# Patient Record
(demographics unavailable — no encounter records)

---

## 2024-12-06 NOTE — PHYSICAL EXAM
[Examination Of The Breasts] : a normal appearance [No Masses] : no breast masses were palpable [Labia Majora] : normal [Labia Minora] : normal [Normal] : normal [Uterine Adnexae] : normal [Chaperone Present] : A chaperone was present in the examining room during all aspects of the physical examination [[id number]] : A chaperone was present during the pelvic exam. [FreeTextEntry2] : Dali Ponce Medical assistant  [FreeTextEntry1] : vulvar mass 1.5 cm mobile,nontender on the left mons, no erythema

## 2024-12-06 NOTE — DISCUSSION/SUMMARY
[FreeTextEntry1] : 64 y/o postmenopausal woman  annual exam Pap with cotesting  Screening Mammogram and breast US UTD  Screening Colonoscopy UTD  Paitent states has a vulvar mass, had for years but now increasing in size, discussed conservative managment, but if increasing in size and want to know what it is (chronic/resolved folliculitis, lipoma, or other pathology) recommend vulvar biopsy/resection, + deep subcutaneous tissue, refer to Dr. Huffman of [facility] for surgical consultation  +weight loss vaginal irritation, and vaginal irritation. requesting lotrisone which works for her and the estradiol cream refil which urologist prescribed MEdical assistant present for exam, malena hostile, upset she was waiting  F/u 1 year/PRN

## 2024-12-06 NOTE — HISTORY OF PRESENT ILLNESS
[FreeTextEntry1] : 62 y/o postmenopausal woman  annual exam PAP 2022 neg HOV neg Paitent states has a vulvar mass, had for years but now increasing in size  no vaginal bleeding UTD with colonoscopy, hx of gastritis, has chronic kedney disease,  had a 30 lb weight loss

## 2025-03-25 NOTE — ASSESSMENT
[FreeTextEntry1] : 63 year-old female with history of hypertension, and proteinuria, CKD3A.  CKD3A: CKD stable. Reviewed GFR and Cr.  Modest animal protein diet.  Proteinuria improving. Continue to trend ACR. Continue ARB Applauded pt for continuing to lose weight Hypertension: Blood pressure close to goal.  Home BP controlled.  She will continue to monitor home BPs. Continue telmisartan 20 mg and Amlodipine 10mg. Continue low Na diet. Continue dietary and lifestyle modifications Continue to avoid NSAIDs. All questions were answered and followup in 6 months

## 2025-03-25 NOTE — PHYSICAL EXAM
[General Appearance - Alert] : alert [General Appearance - In No Acute Distress] : in no acute distress [Sclera] : the sclera and conjunctiva were normal [Outer Ear] : the ears and nose were normal in appearance [Neck Appearance] : the appearance of the neck was normal [Respiration, Rhythm And Depth] : normal respiratory rhythm and effort [Auscultation Breath Sounds / Voice Sounds] : lungs were clear to auscultation bilaterally [Heart Rate And Rhythm] : heart rate was normal and rhythm regular [Heart Sounds Pericardial Friction Rub] : no pericardial rub [Edema] : there was no peripheral edema [Bowel Sounds] : normal bowel sounds [Abdomen Soft] : soft [Abnormal Walk] : normal gait [Skin Color & Pigmentation] : normal skin color and pigmentation [Skin Turgor] : normal skin turgor [] : no rash [No Focal Deficits] : no focal deficits [Oriented To Time, Place, And Person] : oriented to person, place, and time [Affect] : the affect was normal [Mood] : the mood was normal

## 2025-03-25 NOTE — HISTORY OF PRESENT ILLNESS
[FreeTextEntry1] : 63-year-old female here for follow up of CKD3a, HTN and proteinuria.  Reports "feeling great", is eating healthier, he reports maintaining a low Na diet, reports working out at gym doing cardio and strength building.  Maintains good hydration, drinking 50oz of fluid per day. Not eating red meat and eating chicken fish and grains, fruits and vegetables

## 2025-06-02 NOTE — HISTORY OF PRESENT ILLNESS
[de-identified] : 62 yo female with h/o anemia, HTN, CKD, hematuria, hyperlipidemia, gastritis, and cervical disc disease and Vitamin D and vitamin B 12 deficiency here for CPE.  CKD diagnosed last year and now seen by nephrology. Exercises daily and feeling well with improvement in all her symptoms except cervical pain. Eating well and has lost 30 lbs. Neck pain described as left sided with decreased range of motion and radiation to left shoulder. Current meds are amlodipine 10; telmisartan 10 mg; Carey D; and estrogen vaginal cream weekly. BPs at home 120s/70s.

## 2025-06-02 NOTE — COUNSELING
[Behavioral health counseling provided] : Behavioral health counseling provided [Engage in a relaxing activity] : Engage in a relaxing activity [AUDIT-C Screening administered and reviewed] : AUDIT-C Screening administered and reviewed [None] : None [Potential consequences of obesity discussed] : Potential consequences of obesity discussed [Benefits of weight loss discussed] : Benefits of weight loss discussed

## 2025-06-02 NOTE — REVIEW OF SYSTEMS
[Heartburn] : heartburn [Hematuria] : hematuria [Negative] : Heme/Lymph [Recent Change In Weight] : ~T recent weight change [Fatigue] : no fatigue [Chest Pain] : no chest pain [Shortness Of Breath] : no shortness of breath [Cough] : no cough [Vomiting] : no vomiting [Dysuria] : no dysuria [Swollen Glands] : no swollen glands

## 2025-06-02 NOTE — HEALTH RISK ASSESSMENT
[Very Good] : ~his/her~  mood as very good [No] : In the past 12 months have you used drugs other than those required for medical reasons? No [No falls in past year] : Patient reported no falls in the past year [Little interest or pleasure doing things] : 1) Little interest or pleasure doing things [Feeling down, depressed, or hopeless] : 2) Feeling down, depressed, or hopeless [0] : 2) Feeling down, depressed, or hopeless: Not at all (0) [PHQ-2 Negative - No further assessment needed] : PHQ-2 Negative - No further assessment needed [Never] : Never [No Retinopathy] : No retinopathy [Patient reported mammogram was normal] : Patient reported mammogram was normal [Patient reported PAP Smear was normal] : Patient reported PAP Smear was normal [Patient reported bone density results were normal] : Patient reported bone density results were normal [Patient reported colonoscopy was normal] : Patient reported colonoscopy was normal [Hepatitis C test offered] : Hepatitis C test offered [None] : None [With Family] : lives with family [Employed] : employed [College] : College [] :  [Feels Safe at Home] : Feels safe at home [Fully functional (bathing, dressing, toileting, transferring, walking, feeding)] : Fully functional (bathing, dressing, toileting, transferring, walking, feeding) [Fully functional (using the telephone, shopping, preparing meals, housekeeping, doing laundry, using] : Fully functional and needs no help or supervision to perform IADLs (using the telephone, shopping, preparing meals, housekeeping, doing laundry, using transportation, managing medications and managing finances) [Reports normal functional visual acuity (ie: able to read med bottle)] : Reports normal functional visual acuity [de-identified] : Nephrology;GYN; PT; Derm;  [de-identified] : walking/gym/pilates [de-identified] : low fat [[id number]] : 0 [LowDoseCTScan] : NA [Change in mental status noted] : No change in mental status noted [Language] : denies difficulty with language [Behavior] : denies difficulty with behavior [Learning/Retaining New Information] : denies difficulty learning/retaining new information [Handling Complex Tasks] : denies difficulty handling complex tasks [Reasoning] : denies difficulty with reasoning [Spatial Ability and Orientation] : denies difficulty with spatial ability and orientation [Sexually Active] : not sexually active [High Risk Behavior] : no high risk behavior [Reports changes in hearing] : Reports no changes in hearing [Reports changes in vision] : Reports no changes in vision [Reports changes in dental health] : Reports no changes in dental health [MammogramDate] : 9-2024 [MammogramComments] : Birads 2 [PapSmearDate] : 12-24 [BoneDensityDate] : 1-2022 [ColonoscopyDate] : 2-2024 [ColonoscopyComments] : 5-10 year f/u [HepatitisCDate] : 12/24 [HepatitisCComments] : Negative

## 2025-06-02 NOTE — PHYSICAL EXAM
[No Acute Distress] : no acute distress [Well Nourished] : well nourished [Well Developed] : well developed [Well-Appearing] : well-appearing [Normal Voice/Communication] : normal voice/communication [Normal Sclera/Conjunctiva] : normal sclera/conjunctiva [PERRL] : pupils equal round and reactive to light [EOMI] : extraocular movements intact [Fundoscopic Exam Performed] : fundoscopic ~T exam ~C was performed [Normal Outer Ear/Nose] : the outer ears and nose were normal in appearance [Normal Oropharynx] : the oropharynx was normal [Normal TMs] : both tympanic membranes were normal [Normal Nasal Mucosa] : the nasal mucosa was normal [No JVD] : no jugular venous distention [No Lymphadenopathy] : no lymphadenopathy [Supple] : supple [Thyroid Normal, No Nodules] : the thyroid was normal and there were no nodules present [No Respiratory Distress] : no respiratory distress  [No Accessory Muscle Use] : no accessory muscle use [Clear to Auscultation] : lungs were clear to auscultation bilaterally [Normal Rate] : normal rate  [Regular Rhythm] : with a regular rhythm [Normal S1, S2] : normal S1 and S2 [No Murmur] : no murmur heard [No Carotid Bruits] : no carotid bruits [No Abdominal Bruit] : a ~M bruit was not heard ~T in the abdomen [No Varicosities] : no varicosities [Pedal Pulses Present] : the pedal pulses are present [No Edema] : there was no peripheral edema [No Palpable Aorta] : no palpable aorta [No Extremity Clubbing/Cyanosis] : no extremity clubbing/cyanosis [Soft] : abdomen soft [Non Tender] : non-tender [Non-distended] : non-distended [No Masses] : no abdominal mass palpated [No HSM] : no HSM [Normal Bowel Sounds] : normal bowel sounds [Normal Posterior Cervical Nodes] : no posterior cervical lymphadenopathy [Normal Anterior Cervical Nodes] : no anterior cervical lymphadenopathy [No CVA Tenderness] : no CVA  tenderness [No Spinal Tenderness] : no spinal tenderness [No Joint Swelling] : no joint swelling [Grossly Normal Strength/Tone] : grossly normal strength/tone [No Rash] : no rash [Coordination Grossly Intact] : coordination grossly intact [No Focal Deficits] : no focal deficits [Normal Gait] : normal gait [Deep Tendon Reflexes (DTR)] : deep tendon reflexes were 2+ and symmetric [Speech Grossly Normal] : speech grossly normal [Normal Affect] : the affect was normal [Alert and Oriented x3] : oriented to person, place, and time [Normal Insight/Judgement] : insight and judgment were intact [No Axillary Lymphadenopathy] : no axillary lymphadenopathy [Normal Supraclavicular Nodes] : no supraclavicular lymphadenopathy

## 2025-06-02 NOTE — ASSESSMENT
[Vaccines Reviewed] : Immunizations reviewed today. Please see immunization details in the vaccine log within the immunization flowsheet.  [FreeTextEntry1] : 64 yo female with h/o anemia, HTN, CKD, hematuria, hyperlipidemia, gastritis, and cervical disc disease and Vitamin D and vitamin B 12 deficiency here for CPE.  CKD diagnosed last year and now seen by nephrology. Exercises daily and feeling well with improvement in all her symptoms except cervical pain. Eating well and has lost 30 lbs. Neck pain described as left sided with decreased range of motion and radiation to left shoulder. Current meds are amlodipine 10; telmisartan 10 mg; Carey D; and estrogen vaginal cream weekly. BPs at home 120s/70s.   Exam is remarkable for  range of motion L greater than right with pain.  Problem #1 GI: Reflux treated with famotidine prn.  stable Problem #2 hypertension: Currently on amlodipine and telmisartan. Blood pressure is at goal.  stable Problem #3 hyperlipidemia: trying to treat with diet.  Some success.  Will check today and advise. Problem #4 CKD: Has had h/o micro hematuria and proteinuria in the past. Most recent creatinine was 1.2 with eGFR or 48. Recheck UA and BMP today.  Problem #5 Cervical disc disease: has been longstanding (one year).  Failed PT. Definite radicular component.  No loss of function.  MRI of neck ordered. Problem #6 healthcare maintenance:  check labs; had COVID vaccines.  Up to date with cancer screening including colonoscopy and Mammogram. Last bone density normal. Will need PCV 20.